# Patient Record
Sex: MALE | Employment: FULL TIME
[De-identification: names, ages, dates, MRNs, and addresses within clinical notes are randomized per-mention and may not be internally consistent; named-entity substitution may affect disease eponyms.]

---

## 2022-11-23 ENCOUNTER — TRANSCRIBE ORDERS (OUTPATIENT)
Dept: OTHER | Age: 22
End: 2022-11-23

## 2022-11-23 DIAGNOSIS — K59.09 OTHER CONSTIPATION: Primary | ICD-10-CM

## 2023-02-24 ENCOUNTER — TELEPHONE (OUTPATIENT)
Dept: GASTROENTEROLOGY | Facility: CLINIC | Age: 23
End: 2023-02-24

## 2023-06-14 ENCOUNTER — DOCUMENTATION ONLY (OUTPATIENT)
Dept: GASTROENTEROLOGY | Facility: CLINIC | Age: 23
End: 2023-06-14

## 2023-06-20 ENCOUNTER — TELEPHONE (OUTPATIENT)
Dept: GASTROENTEROLOGY | Facility: CLINIC | Age: 23
End: 2023-06-20

## 2023-06-20 NOTE — TELEPHONE ENCOUNTER
Spoke with pt and confirmed pt okay to continue medication, however pt should hold med prior to testing and can take after testing is complete.

## 2023-08-04 ENCOUNTER — DOCUMENTATION ONLY (OUTPATIENT)
Dept: GASTROENTEROLOGY | Facility: CLINIC | Age: 23
End: 2023-08-04
Payer: MEDICAID

## 2023-08-04 NOTE — PROGRESS NOTES
Letter mailed out with patient instructions for HBT.     Left message with patient to verify if letter was received. Also left instructions via voicemail with a callback number if has questions.

## 2023-08-08 ENCOUNTER — TRANSFERRED RECORDS (OUTPATIENT)
Dept: GASTROENTEROLOGY | Facility: CLINIC | Age: 23
End: 2023-08-08

## 2023-08-08 ENCOUNTER — OFFICE VISIT (OUTPATIENT)
Dept: GASTROENTEROLOGY | Facility: CLINIC | Age: 23
End: 2023-08-08
Payer: MEDICAID

## 2023-08-08 VITALS
DIASTOLIC BLOOD PRESSURE: 69 MMHG | RESPIRATION RATE: 16 BRPM | HEIGHT: 67 IN | BODY MASS INDEX: 24.17 KG/M2 | HEART RATE: 60 BPM | OXYGEN SATURATION: 98 % | SYSTOLIC BLOOD PRESSURE: 105 MMHG | WEIGHT: 154 LBS | TEMPERATURE: 97.8 F

## 2023-08-08 DIAGNOSIS — K59.09 OTHER CONSTIPATION: Primary | ICD-10-CM

## 2023-08-08 PROCEDURE — 91065 BREATH HYDROGEN/METHANE TEST: CPT

## 2023-08-08 ASSESSMENT — PAIN SCALES - GENERAL: PAINLEVEL: NO PAIN (0)

## 2023-08-08 NOTE — PATIENT INSTRUCTIONS
Hydrogen Breath Test  1. You may experience diarrhea for the next 12-24 hours.  2. Resume a regular diet.  3. Follow-up with your referring doctor in clinic.  4. If you have questions call [phone] from 7:00am-5:00pm.

## 2023-08-08 NOTE — PROGRESS NOTES
"Non-Invasive GI Procedure Visit    Costa Hobson is a 23 year old male with history of Other constipation.   Patient stated reason for procedure: Bloating and gas and constipation      COVID-19 PCR Results           No data to display              COVID-19 Antibody Results, Testing for Immunity           No data to display                Pre-Procedure Assessment  Patient presents to clinic today for Hydrogen Breath Test    Referring Provider: Ana PACHECO    Previous HBT: No  Is patient a smoker: No    Does patient report having a colonoscopy, barium study, barium enema or taking antibiotics within the last 2 weeks? Yes / No: No.  Does patient report taking a stool softener, fiber supplement, laxative or anti-diarrheal in the last 3 - 4 days? Yes / No: No.  Does the patient report taking a probiotic in the last 1 - 2 days? Yes / No: No.  Does the patient report taking any medications today? No    Does the patient report following the pre-procedure bland diet? Yes  Does patient report being NPO for a minimum of 12 hours before the test? Yes.     Patient reported symptoms:Constipation, Bloating, and Gas  How long has patient had these symptoms? 1.5 years      .    Patient Hx  Patient's history, medications and allergies were reviewed.     Height: 5' 7\"   Weight: 154 lbs 0 oz    There are no problems to display for this patient.     Prior to Admission medications    Not on File     No Known Allergies  No past medical history on file.  No past surgical history on file.  No family history on file.  Social History     Tobacco Use    Smoking status: Not on file    Smokeless tobacco: Not on file   Substance Use Topics    Alcohol use: Not on file        Pre-Procedure Education & Consent  Procedure education was provided to: Patient  Teaching method: Explanation  Barriers to learning: No Barrier    Patient indicated understanding of pre-procedure instruction and appropriate consent was obtained and " documented.    ____________________________________________________________________    Post-Procedure Documentation: Hydrogen Breath Test     Baseline breath obtained prior to patient drinking Lactose.     Patient tolerated test with no complaints.    HBT Results    Sample Clock Times Ppm H2 Ppm CH4 CO2% Comments   Baseline 0920 11 3 4.0     Challenge Dose Given 0925 - - - -   #1 1025 75 5 4.1     #2 1125 133 7 4.2     #3 1225 175 8 4.6     #4             #5             #6             #7             #8             #9               #  B   Patient given discharge instructions.    Notification of pending test results sent to provider for interpretation. Please reference scanned document for final interpretation of results. Patient will follow up with referring provider for test results.    Davina Stephenson RN on 8/8/2023 at 9:15 AM

## 2023-08-10 ENCOUNTER — DOCUMENTATION ONLY (OUTPATIENT)
Dept: GASTROENTEROLOGY | Facility: CLINIC | Age: 23
End: 2023-08-10
Payer: MEDICAID